# Patient Record
Sex: FEMALE | Race: WHITE | URBAN - METROPOLITAN AREA
[De-identification: names, ages, dates, MRNs, and addresses within clinical notes are randomized per-mention and may not be internally consistent; named-entity substitution may affect disease eponyms.]

---

## 2021-01-11 ENCOUNTER — OFFICE VISIT (OUTPATIENT)
Dept: URBAN - METROPOLITAN AREA CLINIC 2 | Facility: CLINIC | Age: 56
End: 2021-01-11
Payer: COMMERCIAL

## 2021-01-11 ENCOUNTER — WEB ENCOUNTER (OUTPATIENT)
Dept: URBAN - METROPOLITAN AREA CLINIC 2 | Facility: CLINIC | Age: 56
End: 2021-01-11

## 2021-01-11 ENCOUNTER — DASHBOARD ENCOUNTERS (OUTPATIENT)
Age: 56
End: 2021-01-11

## 2021-01-11 DIAGNOSIS — Z86.010 HISTORY OF COLON POLYPS: ICD-10-CM

## 2021-01-11 DIAGNOSIS — I88.9 INGUINAL LYMPHADENITIS: ICD-10-CM

## 2021-01-11 PROBLEM — 428283002: Status: ACTIVE | Noted: 2021-01-11

## 2021-01-11 PROCEDURE — G8420 CALC BMI NORM PARAMETERS: HCPCS | Performed by: NURSE PRACTITIONER

## 2021-01-11 PROCEDURE — G8427 DOCREV CUR MEDS BY ELIG CLIN: HCPCS | Performed by: NURSE PRACTITIONER

## 2021-01-11 PROCEDURE — G9903 PT SCRN TBCO ID AS NON USER: HCPCS | Performed by: NURSE PRACTITIONER

## 2021-01-11 PROCEDURE — 99204 OFFICE O/P NEW MOD 45 MIN: CPT | Performed by: NURSE PRACTITIONER

## 2021-01-11 RX ORDER — VALACYCLOVIR 500 MG/1
1 TABLET TABLET, FILM COATED ORAL ONCE A DAY
Status: ACTIVE | COMMUNITY

## 2021-01-11 RX ORDER — CIPROFLOXACIN 500 MG/1
1 TABLET TABLET, FILM COATED ORAL
Qty: 14 TABLET | Refills: 0 | OUTPATIENT
Start: 2021-01-11 | End: 2021-01-18

## 2021-01-11 NOTE — HPI-TODAY'S VISIT:
Pleasant 55 yr old female referred by Dr. Jocelynn Medel for LLQ pain and diverticulosis .   A copy of this document is being forwarded to the referring provider. Patient had CT A/P 12/17/20 for LLQ pain. CT and labs reviewed. Scan was notable for diverticulosis but not diverticulitis. However, did note reactive inguinal lymph nodes. Patient continues to have pressure like symptoms in lower abdomen,. Patient had screening colonoscopy 2016 and did have colon polyp. Her wieght is stable. No change in bowel habits. no blood in stool.

## 2021-01-11 NOTE — PHYSICAL EXAM HENT:
Head,  normocephalic,  atraumatic,  Face,  Face within normal limits,  Ears,  External ears within normal limits,  Nose/Nasopharynx,  External nose  normal appearance,  nares patent,  no nasal discharge,  Mouth and Throat,  Oral cavity appearance normal,  Lips,  Appearance normal